# Patient Record
Sex: FEMALE | Race: OTHER | HISPANIC OR LATINO | URBAN - METROPOLITAN AREA
[De-identification: names, ages, dates, MRNs, and addresses within clinical notes are randomized per-mention and may not be internally consistent; named-entity substitution may affect disease eponyms.]

---

## 2021-11-22 ENCOUNTER — EMERGENCY (EMERGENCY)
Facility: HOSPITAL | Age: 61
LOS: 1 days | Discharge: HOME | End: 2021-11-22
Attending: EMERGENCY MEDICINE
Payer: MEDICAID

## 2021-11-22 VITALS
DIASTOLIC BLOOD PRESSURE: 90 MMHG | HEART RATE: 75 BPM | SYSTOLIC BLOOD PRESSURE: 160 MMHG | OXYGEN SATURATION: 99 % | RESPIRATION RATE: 17 BRPM | TEMPERATURE: 98 F

## 2021-11-22 VITALS
OXYGEN SATURATION: 98 % | WEIGHT: 160.06 LBS | DIASTOLIC BLOOD PRESSURE: 102 MMHG | SYSTOLIC BLOOD PRESSURE: 163 MMHG | RESPIRATION RATE: 18 BRPM | HEART RATE: 85 BPM | TEMPERATURE: 98 F | HEIGHT: 64 IN

## 2021-11-22 PROCEDURE — 99283 EMERGENCY DEPT VISIT LOW MDM: CPT | Mod: 25

## 2021-11-22 PROCEDURE — 73562 X-RAY EXAM OF KNEE 3: CPT

## 2021-11-22 PROCEDURE — 99283 EMERGENCY DEPT VISIT LOW MDM: CPT

## 2021-11-22 PROCEDURE — 73562 X-RAY EXAM OF KNEE 3: CPT | Mod: 26,LT

## 2021-11-22 NOTE — ED ADULT NURSE NOTE - OBJECTIVE STATEMENT
61y female arrived to ED complaining of L knee pain. Patient injured knee yesterday when she twisted her knee while rushing 61y female arrived to ED complaining of L knee pain. Patient injured knee yesterday when she twisted her knee while rushing down the stairs and heard a "crack." Patient denies falling, head injury, LOC, CP, SOB, n/v/d, abd pain, chills, numbness/tingling. No pain at this time. Patient A&Ox4, ambulates independently at baseline, VS stable, Trinidadian speaking.

## 2021-11-22 NOTE — ED PROVIDER NOTE - MUSCULOSKELETAL MINIMAL EXAM
+ttp to left knee to anterior aspect. no swelling appreciated. patient has full ROM however, when ambulating, walks with a limp. strength 5/5 both upper and lower extremities

## 2021-11-22 NOTE — ED ADULT TRIAGE NOTE - CHIEF COMPLAINT QUOTE
left knee pain, injured yesterday while going down the steps. Patient forgot to take BP medication. left knee pain, injured yesterday while going down the steps. Patient forgot to take BP medication, patient asymptomatic for HTN.

## 2021-11-22 NOTE — ED PROVIDER NOTE - ATTENDING CONTRIBUTION TO CARE
60 y/o f with pmhx HTN, Turkish speaking presents for pain on her left knee that began yesterday as she was walking down steps and twisted it. pain is on her knee. no weakness or numbness. believes she heard a pop or crack sound. no other injury. min swelling, pain worse with weight bearing and walking.   Gen.  no acute distress, well appearing female  HEENT:  PERRL EOMI  Lungs:  b/l bs  CVS: S1S2   Abd;  soft non tender no distention  Ext: no pitting edema or erythema, no valgus no shakeel, no deformity. distal neurovasc intact, no ttp  Neuro:  aaox3 no focal deficits  MSK: strength 5/5 b/l upper and lower ext.

## 2021-11-22 NOTE — ED PROVIDER NOTE - NSFOLLOWUPCLINICS_GEN_ALL_ED_FT
[facility]  Orthopedic Surgery  [address], [address]rd & [address]th [place] [address]  Phone: [phone]  Fax:

## 2021-11-22 NOTE — ED ADULT NURSE NOTE - CHIEF COMPLAINT QUOTE
left knee pain, injured yesterday while going down the steps. Patient forgot to take BP medication, patient asymptomatic for HTN.

## 2021-11-22 NOTE — ED PROVIDER NOTE - NSFOLLOWUPINSTRUCTIONS_ED_ALL_ED_FT
take tylenol 975mg for pain every 6-8 hours   apply ace wrap to knee  follow up with orthopedic doctor as instructed  if you developing any new or worsening symptoms such as redness, warmth, swelling, numbness, tingling, please return to the ER as soon as possible.

## 2021-11-22 NOTE — ED PROVIDER NOTE - OBJECTIVE STATEMENT
60 y/o female, Thai speaking,  ID number [id number], pmh HTN, presents to the ER with complaint of left knee pain. states yesterday she was rushing down the stairs to meet someone and she felt her left knee twist. states she heard "something crack" in her knee. denies fall or hitting her head. states she is able to ambulate but has pain on ambulation. denies f/n/v/d, CP, SOB, LOC, numbness, tingling, dizziness, HA, fall. 60 y/o female, Tajik speaking,  ID number [id number], pmh HTN, presents to the ER with complaint of left knee pain. states yesterday she was rushing down the stairs to meet someone and she felt her left knee twist. states she heard "something crack" in her knee. denies fall or hitting her head. states she is able to ambulate but has pain on ambulation. denies f/n/v/d, CP, SOB, LOC, numbness, tingling, dizziness, HA, fall. patient denies pain at this time. states she does not want anything for pain at the moment.

## 2021-11-22 NOTE — ED PROVIDER NOTE - PROGRESS NOTE DETAILS
imaging reviewed with Dr. Steiner. calcification appreciated to left knee. patient states does not want medication for pain at this time. advised to take tylenol for pain. ortho referral to be given at discharge. stable for discharge. ace bandage applied. case discussed with Dr. Steiner

## 2021-12-06 ENCOUNTER — TRANSCRIPTION ENCOUNTER (OUTPATIENT)
Age: 61
End: 2021-12-06

## 2022-02-28 PROBLEM — Z00.00 ENCOUNTER FOR PREVENTIVE HEALTH EXAMINATION: Status: ACTIVE | Noted: 2022-02-28

## 2022-03-02 ENCOUNTER — OUTPATIENT (OUTPATIENT)
Dept: OUTPATIENT SERVICES | Facility: HOSPITAL | Age: 62
LOS: 1 days | End: 2022-03-02
Payer: SELF-PAY

## 2022-03-02 ENCOUNTER — APPOINTMENT (OUTPATIENT)
Age: 62
End: 2022-03-02

## 2022-03-02 VITALS
DIASTOLIC BLOOD PRESSURE: 89 MMHG | TEMPERATURE: 96.8 F | HEIGHT: 60 IN | WEIGHT: 158 LBS | BODY MASS INDEX: 31.02 KG/M2 | HEART RATE: 69 BPM | SYSTOLIC BLOOD PRESSURE: 135 MMHG

## 2022-03-02 DIAGNOSIS — M25.561 PAIN IN RIGHT KNEE: ICD-10-CM

## 2022-03-02 DIAGNOSIS — M25.562 PAIN IN RIGHT KNEE: ICD-10-CM

## 2022-03-02 DIAGNOSIS — M25.569 PAIN IN UNSPECIFIED KNEE: ICD-10-CM

## 2022-03-02 DIAGNOSIS — M79.609 PAIN IN UNSPECIFIED LIMB: ICD-10-CM

## 2022-03-02 PROBLEM — I10 ESSENTIAL (PRIMARY) HYPERTENSION: Chronic | Status: ACTIVE | Noted: 2021-11-22

## 2022-03-02 PROCEDURE — G0463: CPT

## 2023-10-10 ENCOUNTER — APPOINTMENT (OUTPATIENT)
Dept: ORTHOPEDIC SURGERY | Facility: CLINIC | Age: 63
End: 2023-10-10
Payer: MEDICAID

## 2023-10-10 VITALS — BODY MASS INDEX: 31.02 KG/M2 | HEIGHT: 60 IN | WEIGHT: 158 LBS

## 2023-10-10 DIAGNOSIS — M54.50 LOW BACK PAIN, UNSPECIFIED: ICD-10-CM

## 2023-10-10 DIAGNOSIS — M41.126 ADOLESCENT IDIOPATHIC SCOLIOSIS, LUMBAR REGION: ICD-10-CM

## 2023-10-10 DIAGNOSIS — M41.124 ADOLESCENT IDIOPATHIC SCOLIOSIS, THORACIC REGION: ICD-10-CM

## 2023-10-10 DIAGNOSIS — M47.816 SPONDYLOSIS W/OUT MYELOPATHY OR RADICULOPATHY, LUMBAR REGION: ICD-10-CM

## 2023-10-10 DIAGNOSIS — Z86.79 PERSONAL HISTORY OF OTHER DISEASES OF THE CIRCULATORY SYSTEM: ICD-10-CM

## 2023-10-10 PROCEDURE — 99204 OFFICE O/P NEW MOD 45 MIN: CPT

## 2023-10-10 RX ORDER — IBUPROFEN 800 MG/1
800 TABLET, FILM COATED ORAL
Qty: 90 | Refills: 0 | Status: ACTIVE | COMMUNITY
Start: 2023-10-10 | End: 1900-01-01

## 2023-11-02 ENCOUNTER — APPOINTMENT (OUTPATIENT)
Dept: ORTHOPEDIC SURGERY | Facility: CLINIC | Age: 63
End: 2023-11-02